# Patient Record
Sex: MALE | Race: AMERICAN INDIAN OR ALASKA NATIVE
[De-identification: names, ages, dates, MRNs, and addresses within clinical notes are randomized per-mention and may not be internally consistent; named-entity substitution may affect disease eponyms.]

---

## 2017-06-23 ENCOUNTER — HOSPITAL ENCOUNTER (OUTPATIENT)
Dept: HOSPITAL 42 - ED | Age: 52
Setting detail: OBSERVATION
LOS: 1 days | Discharge: HOME | End: 2017-06-24
Attending: INTERNAL MEDICINE | Admitting: INTERNAL MEDICINE
Payer: COMMERCIAL

## 2017-06-23 VITALS — BODY MASS INDEX: 25.5 KG/M2

## 2017-06-23 DIAGNOSIS — M54.12: Primary | ICD-10-CM

## 2017-06-23 DIAGNOSIS — R00.1: ICD-10-CM

## 2017-06-23 DIAGNOSIS — R07.89: ICD-10-CM

## 2017-06-23 DIAGNOSIS — G56.00: ICD-10-CM

## 2017-06-23 LAB
ADD MANUAL DIFF?: NO
ALBUMIN/GLOB SERPL: 1.5 {RATIO} (ref 1.1–1.8)
ALP SERPL-CCNC: 57 U/L (ref 38–133)
ALT SERPL-CCNC: 33 U/L (ref 7–56)
APPEARANCE UR: CLEAR
APTT BLD: 26.2 SECONDS (ref 23.7–30.8)
AST SERPL-CCNC: 25 U/L (ref 15–59)
BASOPHILS # BLD AUTO: 0.04 K/MM3 (ref 0–2)
BASOPHILS NFR BLD: 0.6 % (ref 0–3)
BILIRUB SERPL-MCNC: 0.9 MG/DL (ref 0.2–1.3)
BILIRUB UR-MCNC: NEGATIVE MG/DL
BUN SERPL-MCNC: 14 MG/DL (ref 7–21)
CALCIUM SERPL-MCNC: 10.5 MG/DL (ref 8.4–10.5)
CHLORIDE SERPL-SCNC: 104 MMOL/L (ref 98–107)
CO2 SERPL-SCNC: 20 MMOL/L (ref 21–33)
COLOR UR: YELLOW
EOSINOPHIL # BLD: 0.1 10*3/UL (ref 0–0.7)
EOSINOPHIL NFR BLD: 1.6 % (ref 1.5–5)
ERYTHROCYTE [DISTWIDTH] IN BLOOD BY AUTOMATED COUNT: 15.7 % (ref 11.5–14.5)
GLOBULIN SER-MCNC: 3.4 GM/DL
GLUCOSE SERPL-MCNC: 97 MG/DL (ref 70–110)
GLUCOSE UR STRIP-MCNC: NEGATIVE MG/DL
GRANULOCYTES # BLD: 2.2 10*3/UL (ref 1.4–6.5)
GRANULOCYTES NFR BLD: 35.8 % (ref 50–68)
HCT VFR BLD CALC: 38.5 % (ref 42–52)
INR PPP: 1.04 (ref 0.93–1.08)
KETONES UR STRIP-MCNC: NEGATIVE MG/DL
LEUKOCYTE ESTERASE UR-ACNC: NEGATIVE LEU/UL
LYMPHOCYTES # BLD: 3.3 10*3/UL (ref 1.2–3.4)
LYMPHOCYTES NFR BLD AUTO: 53.7 % (ref 22–35)
MCH RBC QN AUTO: 23.8 PG (ref 25–35)
MCHC RBC AUTO-ENTMCNC: 33.5 G/DL (ref 31–37)
MCV RBC AUTO: 71.2 FL (ref 80–105)
MONOCYTES # BLD AUTO: 0.5 10*3/UL (ref 0.1–0.6)
MONOCYTES NFR BLD: 8.3 % (ref 1–6)
PH UR STRIP: 8 [PH] (ref 4.7–8)
PLATELET # BLD: 265 10^3/UL (ref 120–450)
PMV BLD AUTO: 10.7 FL (ref 7–11)
POTASSIUM SERPL-SCNC: 3.2 MMOL/L (ref 3.6–5)
PROT SERPL-MCNC: 8.4 G/DL (ref 5.8–8.3)
PROT UR STRIP-MCNC: NEGATIVE MG/DL
RBC # UR STRIP: NEGATIVE /UL
SODIUM SERPL-SCNC: 140 MMOL/L (ref 132–148)
SP GR UR STRIP: 1.01 (ref 1–1.03)
TROPONIN I SERPL-MCNC: 0.04 NG/ML
UROBILINOGEN UR STRIP-ACNC: 0.2 E.U./DL
WBC # BLD AUTO: 6.2 10^3/UL (ref 4.5–11)

## 2017-06-23 PROCEDURE — 84443 ASSAY THYROID STIM HORMONE: CPT

## 2017-06-23 PROCEDURE — 36415 COLL VENOUS BLD VENIPUNCTURE: CPT

## 2017-06-23 PROCEDURE — 84484 ASSAY OF TROPONIN QUANT: CPT

## 2017-06-23 PROCEDURE — 80053 COMPREHEN METABOLIC PANEL: CPT

## 2017-06-23 PROCEDURE — 71010: CPT

## 2017-06-23 PROCEDURE — 70450 CT HEAD/BRAIN W/O DYE: CPT

## 2017-06-23 PROCEDURE — 81003 URINALYSIS AUTO W/O SCOPE: CPT

## 2017-06-23 PROCEDURE — 71275 CT ANGIOGRAPHY CHEST: CPT

## 2017-06-23 PROCEDURE — 93005 ELECTROCARDIOGRAM TRACING: CPT

## 2017-06-23 PROCEDURE — 96374 THER/PROPH/DIAG INJ IV PUSH: CPT

## 2017-06-23 PROCEDURE — 85610 PROTHROMBIN TIME: CPT

## 2017-06-23 PROCEDURE — 85730 THROMBOPLASTIN TIME PARTIAL: CPT

## 2017-06-23 PROCEDURE — 85025 COMPLETE CBC W/AUTO DIFF WBC: CPT

## 2017-06-23 PROCEDURE — 80061 LIPID PANEL: CPT

## 2017-06-23 PROCEDURE — 82553 CREATINE MB FRACTION: CPT

## 2017-06-23 PROCEDURE — 74175 CTA ABDOMEN W/CONTRAST: CPT

## 2017-06-23 PROCEDURE — 99285 EMERGENCY DEPT VISIT HI MDM: CPT

## 2017-06-23 PROCEDURE — 82550 ASSAY OF CK (CPK): CPT

## 2017-06-23 PROCEDURE — 83615 LACTATE (LD) (LDH) ENZYME: CPT

## 2017-06-23 NOTE — CT
EXAM:

  CT Head Without Intravenous Contrast



CLINICAL HISTORY:

  52 years old, male; Signs and symptoms; Numbness / parasthesia; Bilateral; 

Additional info: Bilateral arm numbness



TECHNIQUE:

  Axial computed tomography images of the head/brain without intravenous 

contrast.  This CT exam was performed using one or more of the following dose 

reduction techniques:  automated exposure control, adjustment of the mA and/or 

kV according to patient size, and/or use of iterative reconstruction technique.



COMPARISON:

  No relevant prior studies available.



FINDINGS:

  Brain:  No intracranial hemorrhage.  No mass.  No definite edema.

  Ventricles:  No hydrocephalus.

  Bones/joints:  No acute fracture.

  Soft tissues:  Scalp calcifications.

  Sinuses:  No acute sinusitis.

  Mastoid air cells:  No mastoid effusion.

  Orbits:  Unremarkable as visualized.



IMPRESSION:     

1.  No definite acute intracranial abnormality.  Acute infarction may be CT 

occult within first 24 hours.  If a focal deficit persists, consider followup 

CT or MRI for further evaluation.

2.  Incidental/non-acute findings are described above.

## 2017-06-23 NOTE — ED PDOC
Arrival/HPI





- General


Chief Complaint: Weakness/Neurological Deficit


Time Seen by Provider: 06/23/17 18:10


Historian: Patient





- History of Present Illness


Narrative History of Present Illness (Text): 





06/23/17 18:21


Patient is a 52 year old male who denies cardiac history, who presents to the 

emergency department with chest pain that began yesterday and numbness/tingling 

to the arms today. Patient reports he felt a "pull" on the right side of his 

chest yesterday. He states he felt a discomfort in his chest that felt "gassy" 

and radiated to the arms. Patient reports history of "nerve damage" in the 

neck. Patient also states in the past he felt a "rip" in the chest while 

lifting something years ago, however states today's symptoms feel different. He 

states he Today patient also reports pain in the right shoulder blade. Denies 

shortness of breath, urinary symptoms, or other complaints.


Time/Duration: 24 hours


Symptom Onset: Sudden


Symptom Course: Unchanged


Modifying Factors (Text): 


None 





Past Medical History





- Provider Review


Nursing Documentation Reviewed: Yes





- Past History


Past History: No Previous





- Tetanus Immunization


Tetanus Immunization: Unknown





- Past Medical History


Past Medical History: No Previous





- Cardiac


Hx Cardiac Disorders: No





- Pulmonary


Hx Respiratory Disorders: No





- Neurological


Hx Neurological Disorder: No





- HEENT


Hx HEENT Disorder: No





- Renal


Hx Renal Disorder: No





- Endocrine/Metabolic


Hx Endocrine Disorders: No





- Hematological/Oncological


Hx Blood Disorders: No





- Integumentary


Hx Dermatological Disorder: No





- Musculoskeletal/Rheumatological


Hx Falls: No


Other/Comment: 3 pinch nerves





- Gastrointestinal


Hx Gastrointestinal Disorders: No





- Genitourinary/Gynecological


Hx Genitourinary Disorders: No





- Psychiatric


Hx Psychophysiologic Disorder: No


Hx Depression: No


Hx Emotional Abuse: No


Hx Physical Abuse: No


Hx Substance Use: No





- Past Surgical History


Past Surgical History: No Previous





- Anesthesia


Hx Anesthesia: No





- Suicidal Assessment


Feels Threatened In Home Enviroment: No





Family/Social History





- Physician Review


Nursing Documentation Reviewed: Yes


Family/Social History: Unknown Family HX


Smoking Status: Never Smoked


Hx Alcohol Use: No


Hx Substance Use: No


Hx Substance Use Treatment: No





Allergies/Home Meds


Allergies/Adverse Reactions: 


Allergies





No Known Allergies Allergy (Verified 06/23/17 18:19)


 








Home Medications: 


 Home Meds











 Medication  Instructions  Recorded  Confirmed


 


Cyclobenzaprine [Cyclobenzaprine 10 mg PO HS 06/24/17 06/24/17





HCl]   


 


Naproxen [Naprosyn] 500 mg PO BID 06/24/17 06/24/17














Review of Systems





- Review of Systems


Constitutional: absent: Fatigue, Fevers


Eyes: absent: Vision Changes, Eye Pain


ENT: absent: Hearing Changes


Respiratory: absent: SOB, Wheezing


Cardiovascular: Chest Pain.  absent: Calf Pain, GARCIA


Gastrointestinal: absent: Abdominal Pain, Nausea, Vomiting


Genitourinary Male: absent: Dysuria, Frequency, Hematuria


Musculoskeletal: Neck Pain, Other (Right shoulder blade pain).  absent: Back 

Pain


Skin: absent: Rash, Ulcer


Neurological: Other (Numbness/tingling to bilateral arms).  absent: Headache, 

Dizziness


Endocrine: absent: Diaphoresis


Hemo/Lymphatic: absent: Easy Bleeding





Physical Exam





- Physical Exam


Narrative Physical Exam (Text): 


Head:  Atraumatic.  Normocephalic. 


Eyes:  PERRL.  EOMI.  Conjunctivae are not pale.


ENT:  Mucous membranes are moist and intact.  Oropharynx is clear and 

symmetric. 


Neck:  Supple.  Full ROM.  No JVD.  No lymphadenopathy. No paraspinal 

tenderness.


Cardiovascular:  Regular rate.  Regular rhythm.  No murmurs, rubs, or gallops.  

Distal pulses are 2+ and symmetric. Radial pulses strong and symmetric. BP 

equal on re-evaluation in both upper extermities.


Pulmonary/Chest:  No evidence of respiratory distress.  Clear to auscultation 

bilaterally.  No wheezing, rales or rhonchi. Palpable mid and right sided chest 

wall pain on palpation without crepitus or edema.


Abdominal:  Soft and non-distended.  There is no tenderness.  No rebound, 

guarding, or rigidity.  No organomegaly.  Good bowel sounds. 


Back:  No CVA tenderness. Point tenderness right scapula with no edema or 

erythema. No midline back or neck pain.


Extremities:  No edema.   No cyanosis.  No clubbing.  Full range of motion in 

all extremities.  No calf tenderness. Some pain reproduced in left shoulder 

with abducting over head, no elbow or wrist pain. No edema or pulse deficits or 

cyanosis noted in upper extremities.


Skin:  Skin is warm and dry.  No petechiae.  No purpura. 


Neurological:  Alert, awake, and oriented to person, place, time, and 

situation.  Normal speech. Strong and equal grasp. Motor and sensory intact in 

upper and lower extremities. No pronator drift. No meningeal signs.


Psychiatric:  Good eye contact.  Normal interaction, affect, and behavior.


 


06/23/17 22:06





Vital Signs Reviewed: Yes


Vital Signs











  Temp Pulse Resp BP Pulse Ox


 


 06/23/17 22:06   54 L  18  148/79  100


 


 06/23/17 18:15  98.2 F  74  18  148/77  100


 


 06/23/17 18:09  98.2 F  74  18  148/77  100











Temperature: Afebrile


Blood Pressure: Normal


Pulse: Regular


Respiratory Rate: Normal


Appearance: Positive for: Well-Appearing, Non-Toxic, Uncomfortable


Pain Distress: Mild


Mental Status: Positive for: Alert and Oriented X 3


Finger Stick Blood Glucose: 105





Medical Decision Making


ED Course and Treatment: 





Differential Diagnosis include but are not limited to:  CAD, PE, aortic 

dissection, cervical radiculopathy, MI





Plan:


Will obtain CT Angio, Chest X-ray, EKG, and labs.





Progress Notes: 


Patient reports intermittent right sided now midsternal chest pain. On exam. 

strong pulses in both upper extremities. CXR with unremarkable mediastinum. EKG 

compared to previous with no acute st elevations, nonspecific t wave changes. 

Chest pain not exertional or associated with SOB currently. No hypoxia, no 

pleuritic pain.


Given sudden onset of arm numbness/chest pain/back pain, ct angio ordered, as 

per [facility] radiologist, NO pulmonary embolism, no aortic dissection.





Patient initial troponin 0.04. He denies family hx of CAD or MI. Denies 

diabetes.


Patient with no recent cardiac evaluation, will be admitted to telemetry 

observation to oncall physician Dr. Henderson, case accepted by covering 

physician.





Patient experienced arm numbness again at 21:30. Re-examined, NO neuro deficits

, no pulse deficits. No abdominal pain.


EKG sinus samantha but no acute st elevations.


Denies chest pain.


CT head ordered for intermittent arm numbness. Patient however with no focal 

weakness, no slurred speech or gait instability. 


Family updated, patient will be admitted for cardiac evaluation and serial 

exams.


06/23/17 22:11








- Lab Interpretations


Lab Results: 








 06/23/17 18:10 





 06/23/17 18:10 





 Lab Results





06/23/17 19:10: Urine Color Yellow, Urine Appearance Clear, Urine pH 8.0, Ur 

Specific Gravity 1.010, Urine Protein Negative, Urine Glucose (UA) Negative, 

Urine Ketones Negative, Urine Blood Negative, Urine Nitrate Negative, Urine 

Bilirubin Negative, Urine Urobilinogen 0.2, Ur Leukocyte Esterase Negative


06/23/17 18:10: Sodium 140, Potassium 3.2 L, Chloride 104, Carbon Dioxide 20 L, 

Anion Gap 19, BUN 14, Creatinine 1.1, Est GFR (African Amer) > 60, Est GFR (Non-

Af Amer) > 60, Random Glucose 97, Calcium 10.5, Total Bilirubin 0.9, AST 25, 

ALT 33, Alkaline Phosphatase 57, Lactate Dehydrogenase 383, Total Creatine 

Kinase 235 H, CK-MB (CK-2) 2.0, CK-MB (CK-2) % Cancelled, Troponin I 0.04, 

Total Protein 8.4 H, Albumin 5.0 H, Globulin 3.4, Albumin/Globulin Ratio 1.5


06/23/17 18:10: PT 11.2, INR 1.04, APTT 26.2


06/23/17 18:10: WBC 6.2, RBC 5.41, Hgb 12.9 L, Hct 38.5 L, MCV 71.2 L, MCH 23.8 

L, MCHC 33.5, RDW 15.7 H, Plt Count 265, MPV 10.7, Gran % 35.8 L, Lymph % (Auto

) 53.7 H, Mono % (Auto) 8.3 H, Eos % (Auto) 1.6, Baso % (Auto) 0.6, Gran # 2.20

, Lymph # 3.3, Mono # 0.5, Eos # 0.1, Baso # 0.04











- RAD Interpretation


Radiology Orders: 








06/23/17 18:21


ANGIOGRAPHY DISECTION PROTOCOL [CT] Stat 


CHEST PORTABLE [RAD] Stat 





06/23/17 21:46


HEAD W/O CONTRAST [CT] Stat 











: Radiologist





- EKG Interpretation


EKG Interpretation (Text): 





06/23/17 22:09


EKG at 18:11 normal sinus rhythm rate of 72 minimal voltage criteria for lvh, 

nonspecific t wave abnormality


EKG at 21:46 sinus bradycardia rate of 56, nonspecific t wave abnormality


Interpreted by ED Physician: Yes


Type: 12 lead EKG


Comparison: Similar to previous EKG





- Medication Orders


Current Medication Orders: 











Discontinued Medications





Aspirin (Aspirin Chewable)  81 mg PO STAT STA


   Stop: 06/23/17 18:23


   Last Admin: 06/23/17 19:35  Dose: 81 mg





Cyclobenzaprine HCl (Flexeril)  10 mg PO HS BEBE


Famotidine (Pepcid)  20 mg IVP STAT STA


   Stop: 06/23/17 22:06


   Last Admin: 06/23/17 23:30  Dose: 20 mg





Naproxen (Anaprox Ds)  550 mg PO BID BEBE


Potassium Chloride (K-Dur 20 Meq Er Tab)  20 meq PO STAT STA


   Stop: 06/23/17 19:17


   Last Admin: 06/23/17 19:35  Dose: 20 meq











- Scribe Statement


The provider has reviewed the documentation as recorded by the Milagros Oro





Provider Scribe Attestation:


All medical record entries made by the Milagros were at my direction and 

personally dictated by me. I have reviewed the chart and agree that the record 

accurately reflects my personal performance of the history, physical exam, 

medical decision making, and the department course for this patient. I have 

also personally directed, reviewed, and agree with the discharge instructions 

and disposition. 





Disposition/Present on Arrival





- Present on Arrival


Any Indicators Present on Arrival: No


History of DVT/PE: No


History of Uncontrolled Diabetes: No


Urinary Catheter: No


History of Decub. Ulcer: No


History Surgical Site Infection Following: None





- Disposition


Have Diagnosis and Disposition been Completed?: Yes


Diagnosis: 


 Chest pain, Arm numbness





Disposition: HOSPITALIZED


Disposition Time: 21:00


Patient Plan: Admission, Observation, Telemetry


Condition: FAIR

## 2017-06-24 VITALS
RESPIRATION RATE: 18 BRPM | SYSTOLIC BLOOD PRESSURE: 122 MMHG | TEMPERATURE: 98.5 F | DIASTOLIC BLOOD PRESSURE: 76 MMHG | HEART RATE: 86 BPM

## 2017-06-24 VITALS — OXYGEN SATURATION: 99 %

## 2017-06-24 LAB
ALBUMIN/GLOB SERPL: 1.3 {RATIO} (ref 1.1–1.8)
ALP SERPL-CCNC: 46 U/L (ref 38–133)
ALT SERPL-CCNC: 34 U/L (ref 7–56)
AST SERPL-CCNC: 25 U/L (ref 15–59)
BILIRUB SERPL-MCNC: 1.2 MG/DL (ref 0.2–1.3)
BUN SERPL-MCNC: 12 MG/DL (ref 7–21)
CALCIUM SERPL-MCNC: 9.5 MG/DL (ref 8.4–10.5)
CHLORIDE SERPL-SCNC: 107 MMOL/L (ref 98–107)
CHOLEST SERPL-MCNC: 290 MG/DL (ref 130–200)
CO2 SERPL-SCNC: 21 MMOL/L (ref 21–33)
GLOBULIN SER-MCNC: 3.4 GM/DL
GLUCOSE SERPL-MCNC: 83 MG/DL (ref 70–110)
POTASSIUM SERPL-SCNC: 4.2 MMOL/L (ref 3.6–5)
PROT SERPL-MCNC: 7.9 G/DL (ref 5.8–8.3)
SODIUM SERPL-SCNC: 138 MMOL/L (ref 132–148)

## 2017-06-24 NOTE — RAD
HISTORY:

chest pain  



COMPARISON:

12/09/2012 



FINDINGS:



LUNGS:

No active pulmonary disease.



PLEURA:

No significant pleural effusion identified, no pneumothorax apparent.



CARDIOVASCULAR:

Normal.



OSSEOUS STRUCTURES:

No significant abnormalities.



VISUALIZED UPPER ABDOMEN:

Normal.



OTHER FINDINGS:

None.



IMPRESSION:

No active disease.

## 2017-06-24 NOTE — CON
DATE: 06/24/2017



REASON FOR CONSULTATION:  Cardiac evaluation, admitted with numbness in both hands.



BRIEF CLINICAL HISTORY:  This is a 52-year-old male with no cardiac history, history of a pinched ner
ve recently diagnosed by MRI.  He said he would lift up some heavy stuff.  He was for the lunch.  He 
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  He felt numbness and tingling sensation
 in both arm and hand, start from elbow to down.  Denies any chest pain, denies any shortness of baldemar
th, denies any palpitation.  States that, a couple of years ago, the patient had a pulled muscle ever
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  At that time, he 
pulled muscles in the chest; and said every now and then chest hurts when he lifts up the heavy staff
, but denies any dyspnea on exertion or chest pain on exertion.



PAST MEDICAL HISTORY:  Nothing significant.



SOCIAL HISTORY:  Denies smoking.  Denies any history of alcohol abuse.



CURRENT MEDICATIONS:  None.



REVIEW OF SYSTEMS:  As per HPI.



PHYSICAL EXAMINATION:

VITAL SIGNS:  Temperature afebrile, heart rate 58, blood pressure 120/61.

HEENT:  PERRLA.  Extraocular muscles intact.

NECK:  Supple.  No carotid bruits.  No thyromegaly.

CHEST:  Clear to auscultation.

HEART:  S1, S2 regular.

ABDOMEN:  Soft.

EXTREMITIES:  Clubbing and cyanosis negative.



LABORATORY DATA:  Blood workup as follows:  WBC 6.3, hemoglobin 12.1, hematocrit 38.5, platelet count
 265.  Chemistry shows sodium 130, potassium 4.2, chloride 107, carbon dioxide 21, anion gap of 14, B
UN 12, creatinine 1.1.  Troponin 0.04.



IMPRESSION:  Atypical chest pain.  Numbness, most likely secondary to patient heavy lifting the stuff
.  History of chest pain off and on after having heavy stuff when he says he pulled the muscles.  No 
history of chest pain.  No history of acute myocardial infarction.  EKG shows sinus samantha at 56, othe
rwise normal.



RECOMMENDATION:  We will discontinue telemetry.  Follow up, add on TSH, lipid profile.  We will follo
w with you.  For risk stratification, consider stress test as outpatient.



Thank you, Dr. Henderson, for providing the opportunity in taking care of the patient.





__________________________________________

Chantale Paris MD







cc:



DD: 06/24/2017 11:32:42  [id number]

TT: 06/24/2017 14:07:28

Confirmation # [id number]

Dictation # [id number]

tn
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
## 2017-06-24 NOTE — CON
DATE: 06/24/2017



CHIEF COMPLAINT:  Tingling and numbness in the arms.



HISTORY OF PRESENT ILLNESS:  This is a 52-year-old man with a past medical history of cervical disk d
isease and history of radiculopathy, history of carpal tunnel syndrome, who presented to the hospital
 because of tingling and numbness in the arms, especially radiating from the neck along with decrease
d hand  occasionally.  Currently, his neuro exam is nonfocal except for some evidence of carpal 
tunnel based on symptoms, as well as cervical radiculopathy.  He [facility] who was giving him physical therapy, which he has seen some improvement.  He denies 
any chest pain at this time, any change in sense of vision, taste or smell.  No headaches at this lindsey
e.  CT head showed no acute intracranial abnormality.  Neuro exam is nonfocal.



PAST MEDICAL HISTORY:  Cervical radiculopathy _____ bilateral carpal tunnel syndrome.



REVIEW OF SYSTEMS:  A 14-point review of systems is negative except as in the HPI.



SOCIAL HISTORY:  No illicit drug use, smoking, or ETOH abuse.



ALLERGIES:  No known drug allergies.



FAMILY HISTORY:  Noncontributory.



MEDICATIONS:  Reviewed via nurse's reconciliation sheet.



PHYSICAL EXAMINATION:

VITAL SIGNS:  Temperature 98, pulse rate of 45, blood pressure 120/61, respiratory rate 20, oxygen sa
turation 99% on room air.

GENERAL:  The patient is sitting up in bed in no acute distress.

HEENT:  Atraumatic, normocephalic.  PERRLA. Extraocular muscles intact.

NECK:  Supple, no JVD, no adenopathy noted.

LUNGS:  Clear to auscultation.  No adventitious sounds.

HEART:  S1, S2, normal rate and rhythm.  No murmurs, rubs, or gallops.

ABDOMEN:  Soft, nontender, nondistended.  Bowel sounds present.

EXTREMITIES:  No clubbing, no cyanosis.  Peripheral pulses 2+ felt bilaterally.

NEUROLOGIC:  The patient is alert, oriented to person, place, month and year.  Speech is fluent witho
ut any errors.  Cranial nerves II through XII are intact.

MOTOR:  Moves all extremities equally.  No pronator drift seen.

SENSORY:  Light touch, pinprick, proprioception, vibration intact.  DTRs 2+ throughout.

COORDINATION:  Finger-to-nose intact.  

GAIT:  Deferred for now.



LABORATORIES:  CMP is unremarkable.  Sodium is 138, potassium is 4.2, chloride 107, carbon dioxide 21
, BUN of 12, creatinine 1.1.  Random glucose of 83.



ASSESSMENT AND PLAN:  This is a 52-year-old -American man with past medical history of chronic
 cervical neck disease, history of carpal tunnel, who presented with tingling and numbness of the arm
s and thought his arms were numb.  Based on neuro exam and his presentation, his symptoms are more of
 a cervical radiculopathy, superimposed underlying carpal tunnel syndrome.  At this time, recommend:

1.  Gabapentin 300 mg p.o. at bedtime for neuropathic pain relief.

2.  Continue with physical therapy for cervical radiculopathy.

3.  Follow up with his neurologist as an outpatient.  He is stable from my standpoint for discharge.





__________________________________________

Berny Borja MD







cc:



DD: 06/24/2017 12:05:46  [id number]

TT: 06/24/2017 12:30:16

Confirmation # [id number]

Dictation # [id number]

lauren

## 2017-06-24 NOTE — CT
PROCEDURE:  CT Angiography Chest, Abdomen and Pelvis with and without 

intravenous contrast



HISTORY:

chest pain radiating to back



COMPARISON:

None.



TECHNIQUE:

Contiguous axial images of the chest, abdomen and pelvis were 

obtained in the phase of aortic enhancement.  A noncontrast enhanced 

CT of the chest was also obtained to evaluate for possible intramural 

thrombus.  Coronal and sagittal reformats were generated. 



IV dose administered:  100 cc of Visipaque 



Radiation dose:



Total exam DLP = 638 mGy-cm.



This CT exam was performed using one or more of the following dose 

reduction techniques: Automated exposure control, adjustment of the 

mA and/or kV according to patient size, and/or use of iterative 

reconstruction technique.



FINDINGS:



CT ANGIOGRAPHY OF THE CHEST WITH & WITHOUT CONTRAST:



AORTA (CHEST AND ABDOMEN):  The thoracic and abdominal aorta are 

unremarkable, without aneurysm, dissection or rupture. No intramural 

thrombus identified in the thoracic aorta on the non-contrast ct of 

the chest.



The celiac axis, superior mesenteric artery, inferior mesenteric 

artery and the renal arteries are widely patent.



The pelvic arteries are unremarkable.



LUNGS:  Clear. No nodule, mass or consolidation.



MEDIASTINUM:  Unremarkable. Normal caliber aorta and pulmonary 

arterial trunk. No aortic dissection. Normal size heart.



LYMPH NODES:  Unremarkable.



PLEURA:  Unremarkable. No pneumothorax. No pleural fluid.



BONES:  Unremarkable.



OTHER FINDINGS:  None.



CT ANGIOGRAPHY OF THE ABDOMEN AND PELVIS WITH CONTRAST:  



LIVER:  There is a 7 mm hypervascular nodule in the left lobe seen on 

series 2, image 191. The ACR criteria for these lesions suggests the 

following. For patients with load average risk of malignancy no 

further follow-up is necessary.  For patients with high risk of 

malignancy CT or MRI follow-up in 6 months. 



GALLBLADDER AND BILE DUCTS:  Unremarkable. 



PANCREAS:  Unremarkable. No gross lesion or ductal dilatation.



SPLEEN:  Unremarkable. 



ADRENALS:  Unremarkable. No mass. 



KIDNEYS AND URETERS:  Unremarkable. No hydronephrosis. No solid mass. 



VASCULATURE:  Unremarkable. No aortic aneurysm. 



STOMACH AND BOWEL:  Unremarkable. No obstruction. No gross mural 

thickening. 



APPENDIX:  Normal appendix. 



PERITONEUM:  Unremarkable. No free fluid. No free air. 



LYMPH NODES:  Unremarkable. No enlarged lymph nodes. 



BLADDER:  Unremarkable. 



REPRODUCTIVE:  Unremarkable. 



BONES:  No acute fracture. 



OTHER FINDINGS:  The report concurs with the preliminary Virtual 

Radiologic report



IMPRESSION:

No acute findings.  No evidence of dissection

## 2017-06-24 NOTE — CARD
--------------- APPROVED REPORT --------------





EKG Measurement

Heart [id number]

ME 170P50

[id number]

[id number]

[id number]



<Conclusion>

Sinus bradycardia

Minimal voltage criteria for LVH, may be normal variant

Nonspecific T wave abnormality

Abnormal ECG

## 2017-06-24 NOTE — CARD
--------------- APPROVED REPORT --------------





EKG Measurement

Heart [id number]

TN 168P56

[id number]

[id number]

[id number]



<Conclusion>

Normal sinus rhythm

Minimal voltage criteria for LVH, may be normal variant

Nonspecific T wave abnormality

Abnormal ECG

## 2018-04-17 ENCOUNTER — HOSPITAL ENCOUNTER (EMERGENCY)
Dept: HOSPITAL 42 - ED | Age: 53
Discharge: HOME | End: 2018-04-17
Payer: COMMERCIAL

## 2018-04-17 VITALS — TEMPERATURE: 98.8 F | RESPIRATION RATE: 18 BRPM

## 2018-04-17 VITALS — OXYGEN SATURATION: 99 % | DIASTOLIC BLOOD PRESSURE: 74 MMHG | HEART RATE: 64 BPM | SYSTOLIC BLOOD PRESSURE: 148 MMHG

## 2018-04-17 VITALS — BODY MASS INDEX: 25.5 KG/M2

## 2018-04-17 DIAGNOSIS — R07.2: Primary | ICD-10-CM

## 2018-04-17 NOTE — RAD
HISTORY:

Chest pain.



COMPARISON:

06/23/2017



TECHNIQUE:

Chest PA and lateral



FINDINGS:



LUNGS:

No active pulmonary disease.



PLEURA:

No significant pleural effusion identified. No pneumothorax apparent.



CARDIOVASCULAR:

Normal.



OSSEOUS STRUCTURES:

No significant abnormalities.



VISUALIZED UPPER ABDOMEN:

Normal.



OTHER FINDINGS:

None.



IMPRESSION:

No active disease. No significant interval change compared to the 

prior examination(s).

## 2018-04-17 NOTE — CARD
--------------- APPROVED REPORT --------------





EKG Measurement

Heart [id number]

AK [id number]

[id number]

[id number]

[id number]



<Conclusion>

Sinus bradycardia

Otherwise normal ECG

## 2018-04-17 NOTE — ED PDOC
Arrival/HPI





- General


Chief Complaint: Trauma


Time Seen by Provider: 04/17/18 15:59


Historian: Patient





- History of Present Illness


Narrative History of Present Illness (Text): 


04/17/18 17:58


A 53 year old male presents to the emergency department complaining of 

midsternal chest pain today. Patient is an employee of [employer]. He reports his pain 

began shortly after lifting a heavy object at work. Patient describes the pain 

as a sharp sensation. Patient was seen in the emergency room for a similar 

episode last year. Patient denies any fever, chills, nausea, vomiting, 

abdominal pain, shortness of breath or any other complaints. 





Time/Duration: Prior to Arrival


Quality: Other (sharp)


Context: Work





Past Medical History





- Provider Review


Nursing Documentation Reviewed: Yes





- Past History


Past History: No Previous





- Infectious Disease


Hx of Infectious Diseases: None





- Tetanus Immunization


Tetanus Immunization: Unknown





- Past Medical History


Past Medical History: No Previous





- Cardiac


Hx Cardiac Disorders: No





- Pulmonary


Hx Respiratory Disorders: No





- Neurological


Hx Neurological Disorder: No





- HEENT


Hx HEENT Disorder: No





- Renal


Hx Renal Disorder: No





- Endocrine/Metabolic


Hx Endocrine Disorders: No





- Hematological/Oncological


Hx Blood Disorders: No





- Integumentary


Hx Dermatological Disorder: No





- Musculoskeletal/Rheumatological


Hx Falls: No


Other/Comment: 3 pinch nerves





- Gastrointestinal


Hx Gastrointestinal Disorders: No





- Genitourinary/Gynecological


Hx Genitourinary Disorders: No





- Psychiatric


Hx Psychophysiologic Disorder: No


Hx Depression: No


Hx Emotional Abuse: No


Hx Physical Abuse: No


Hx Substance Use: No





- Past Surgical History


Past Surgical History: No Previous





- Anesthesia


Hx Anesthesia: No





- Suicidal Assessment


Feels Threatened In Home Enviroment: No





Family/Social History





- Physician Review


Nursing Documentation Reviewed: Yes


Family/Social History: No Known Family HX


Smoking Status: Never Smoked


Hx Alcohol Use: No


Hx Substance Use: No


Hx Substance Use Treatment: No





Allergies/Home Meds


Allergies/Adverse Reactions: 


Allergies





No Known Allergies Allergy (Verified 06/23/17 18:19)


 








Home Medications: 


 Home Meds











 Medication  Instructions  Recorded  Confirmed


 


No Known Home Med  04/17/18 04/17/18














Review of Systems





- Physician Review


All systems were reviewed & negative as marked: Yes





- Review of Systems


Constitutional: absent: Fevers, Night Sweats


Respiratory: absent: SOB


Cardiovascular: Chest Pain


Gastrointestinal: absent: Abdominal Pain, Nausea, Vomiting





Physical Exam


Vital Signs Reviewed: Yes


Vital Signs











  Temp Pulse Resp BP Pulse Ox


 


 04/17/18 17:07   64  18  148/74  99


 


 04/17/18 15:52  98.8 F  62  18  159/79 H  98











Temperature: Afebrile


Blood Pressure: Hypertensive


Pulse: Regular


Respiratory Rate: Normal


Appearance: Positive for: Well-Appearing, Non-Toxic, Comfortable


Pain Distress: None


Mental Status: Positive for: Alert and Oriented X 3





- Systems Exam


Head: Present: Atraumatic, Normocephalic


Pupils: Present: PERRL


Extroacular Muscles: Present: EOMI


Conjunctiva: Present: Normal


Mouth: Present: Moist Mucous Membranes


Neck: Present: Normal Range of Motion


Respiratory/Chest: Present: Clear to Auscultation, Good Air Exchange, Tender to 

Palpation (mild tenderness to midsternal region).  No: Respiratory Distress, 

Accessory Muscle Use


Cardiovascular: Present: Regular Rate and Rhythm, Normal S1, S2.  No: Murmurs


Abdomen: No: Tenderness, Distention, Peritoneal Signs


Back: Present: Normal Inspection


Upper Extremity: Present: Normal Inspection.  No: Cyanosis, Edema


Lower Extremity: Present: Normal Inspection.  No: Edema


Neurological: Present: GCS=15, CN II-XII Intact, Speech Normal


Skin: Present: Warm, Dry, Normal Color.  No: Rashes


Psychiatric: Present: Alert, Oriented x 3, Normal Insight, Normal Concentration





Medical Decision Making


ED Course and Treatment: 


04/17/18 17:58


Impression:


A 53 year old male with midsternal chest pain. Patient is refusing any lab work 

to be done.





Plan:


-- Chest xray


-- EKG 


-- Naproxen


-- Reassess and disposition





Progress Notes:


Report Date : 04/17/2018 18:00:54


Procedure: Chest xray


Dictator : Betito Oro MD


IMPRESSION: No active disease. No significant interval change compared to the 

prior examination(s).





EKG and chest xray unremarkable. On re-evaluation, patient feels better and is 

in no acute distress. I have discussed the results and plan with the patient, 

who expresses understanding. Patient in agreement with plan to be discharged 

home. Patient is stable for discharge. Patient was instructed to follow up with 

physician or return if symptoms worsen or new concerning symptoms arise.











- RAD Interpretation


Radiology Orders: 








04/17/18 16:39


CXR [CHEST TWO VIEWS (PA/LAT)] [RAD] Stat 














- Medication Orders


Current Medication Orders: 











Discontinued Medications





Naproxen (Anaprox Ds)  550 mg PO STAT STA


   Stop: 04/17/18 16:40


   Last Admin: 04/17/18 17:22  Dose: 550 mg











- Scribe Statement


The provider has reviewed the documentation as recorded by the Pericoibthomas Willingham





Provider Scribe Attestation:


All medical record entries made by the Scribe were at my direction and 

personally dictated by me. I have reviewed the chart and agree that the record 

accurately reflects my personal performance of the history, physical exam, 

medical decision making, and the department course for this patient. I have 

also personally directed, reviewed, and agree with the discharge instructions 

and disposition.








Disposition/Present on Arrival





- Present on Arrival


Any Indicators Present on Arrival: No


History of DVT/PE: No


History of Uncontrolled Diabetes: No


Urinary Catheter: No


History of Decub. Ulcer: No


History Surgical Site Infection Following: None





- Disposition


Have Diagnosis and Disposition been Completed?: Yes


Diagnosis: 


 Chest pain





Disposition: HOME/ ROUTINE


Disposition Time: 18:08


Condition: STABLE


Discharge Instructions (ExitCare):  Chest Pain (ED)


Additional Instructions: 


return to er with worsening symptoms or concerns. you are declining lab 

evaluation you are able to return to er with any worsening symptoms or 

concerns. 


Referrals: 


Heidy Galvan MD [Primary Care Provider] - Follow up with primary


Forms:  [email or web address] (English), WORK NOTE